# Patient Record
Sex: MALE | Race: WHITE | NOT HISPANIC OR LATINO | Employment: FULL TIME | URBAN - METROPOLITAN AREA
[De-identification: names, ages, dates, MRNs, and addresses within clinical notes are randomized per-mention and may not be internally consistent; named-entity substitution may affect disease eponyms.]

---

## 2024-02-11 ENCOUNTER — HOSPITAL ENCOUNTER (EMERGENCY)
Facility: HOSPITAL | Age: 34
Discharge: HOME | End: 2024-02-11
Attending: EMERGENCY MEDICINE

## 2024-02-11 VITALS
WEIGHT: 245 LBS | RESPIRATION RATE: 20 BRPM | TEMPERATURE: 98 F | DIASTOLIC BLOOD PRESSURE: 107 MMHG | BODY MASS INDEX: 34.3 KG/M2 | HEIGHT: 71 IN | SYSTOLIC BLOOD PRESSURE: 141 MMHG | HEART RATE: 92 BPM | OXYGEN SATURATION: 96 %

## 2024-02-11 DIAGNOSIS — I49.3 PVC'S (PREMATURE VENTRICULAR CONTRACTIONS): Primary | ICD-10-CM

## 2024-02-11 DIAGNOSIS — R00.2 PALPITATIONS: ICD-10-CM

## 2024-02-11 PROCEDURE — 93010 ELECTROCARDIOGRAM REPORT: CPT | Mod: ,,, | Performed by: INTERNAL MEDICINE

## 2024-02-11 PROCEDURE — 99283 EMERGENCY DEPT VISIT LOW MDM: CPT | Mod: 25

## 2024-02-11 PROCEDURE — 93005 ELECTROCARDIOGRAM TRACING: CPT

## 2024-02-11 NOTE — DISCHARGE INSTRUCTIONS
Follow-up with cardiology as necessary, decrease the amount of nicotine and caffeine stimulants that your drinking throughout the day.  This means cutting a red Bulls or your power drinks down to 1 a day.  Cut your cigarettes to zero.

## 2024-02-11 NOTE — ED PROVIDER NOTES
"Encounter Date: 2/11/2024       History     Chief Complaint   Patient presents with    Palpitations     Intermittent palpitations x 2 weeks     Well-developed 33-year-old male comes emergency room via his mother with concerns of "odd heartbeats".  The patient states every now and then he feels a thump into his chest.  Feels like his heart is "beating backwards".  The patient denies any chest pain associated with this.  Was concerned inside the comes emergency room.  The patient has drank too much to drink some morning.  Smokes half pack cigarettes a day.  Drinks copious amounts of coffee throughout the day as well.  Past medical history of anxiety bipolar herpes and hypertension.  He appears stable at this time.      Review of patient's allergies indicates:  No Known Allergies  Past Medical History:   Diagnosis Date    Anxiety     Bipolar 1 disorder     Herpes     Hypertension      History reviewed. No pertinent surgical history.  History reviewed. No pertinent family history.  Social History     Tobacco Use    Smoking status: Every Day     Types: Cigarettes    Smokeless tobacco: Never   Substance Use Topics    Drug use: Yes     Types: Marijuana     Review of Systems   Constitutional:  Negative for fever.   HENT:  Negative for sore throat.    Respiratory:  Negative for shortness of breath.    Cardiovascular:  Positive for palpitations (Occasional palpitation.). Negative for chest pain.   Gastrointestinal:  Negative for nausea.   Genitourinary:  Negative for dysuria.   Musculoskeletal:  Negative for back pain.   Skin:  Negative for rash.   Neurological:  Negative for weakness.   Hematological:  Does not bruise/bleed easily.   All other systems reviewed and are negative.      Physical Exam     Initial Vitals [02/11/24 1453]   BP Pulse Resp Temp SpO2   (!) 141/107 92 20 98.2 °F (36.8 °C) 96 %      MAP       --         Physical Exam    Nursing note and vitals reviewed.  Constitutional: He appears well-developed and " well-nourished.   HENT:   Head: Normocephalic and atraumatic.   Eyes: EOM are normal. Pupils are equal, round, and reactive to light. No scleral icterus.   Neck: Neck supple.   Normal range of motion.  Cardiovascular:  Normal rate, regular rhythm and normal heart sounds.           No murmur heard.  Positive occasional palpitation.  Primarily PVC.   Pulmonary/Chest: Breath sounds normal. He has no wheezes. He has no rales. He exhibits no tenderness.   Abdominal: Abdomen is soft. Bowel sounds are normal.   Musculoskeletal:         General: Normal range of motion.      Cervical back: Normal range of motion and neck supple.     Neurological: He is alert and oriented to person, place, and time. He has normal strength. GCS score is 15. GCS eye subscore is 4. GCS verbal subscore is 5. GCS motor subscore is 6.   Skin: Skin is warm and dry.   Psychiatric: He has a normal mood and affect. His behavior is normal. Judgment and thought content normal.         ED Course   Procedures  Labs Reviewed - No data to display  EKG Readings: (Independently Interpreted)   Rate 85, normal sinus rhythm, normal axis, normal QRS, normal T-wave, normal P wave, normal EKG.       Imaging Results    None          Medications - No data to display  Medical Decision Making  A. fib RVR, A. fib, PVCs, electrolyte of the mouth is, MI, anxiety reaction, tachycardia, SVT.      Amount and/or Complexity of Data Reviewed  Discussion of management or test interpretation with external provider(s): The patient is stable this time.  The patient is having PVCs.  Likely secondary to caffeine and nicotine intake.  I discussed with the patient.  Also associated with some anxiety.  I discussed with the patient to decrease the intake of caffeine and stimulants he understands and agrees with the plan.  I will discharge patient at this time.  I will consult Cardiology for a follow-up.                                      Clinical Impression:  Final diagnoses:  [R00.2]  Palpitations  [I49.3] PVC's (premature ventricular contractions) (Primary)          ED Disposition Condition    Discharge Stable          ED Prescriptions    None       Follow-up Information    None          Siva Cook MD  02/11/24 1412

## 2024-02-12 LAB
OHS QRS DURATION: 86 MS
OHS QTC CALCULATION: 430 MS

## 2024-02-29 ENCOUNTER — TELEPHONE (OUTPATIENT)
Dept: CARDIOLOGY | Facility: CLINIC | Age: 34
End: 2024-02-29

## 2024-03-01 ENCOUNTER — TELEPHONE (OUTPATIENT)
Dept: CARDIOLOGY | Facility: CLINIC | Age: 34
End: 2024-03-01

## 2024-03-01 NOTE — TELEPHONE ENCOUNTER
Called first number and it is not working. Second number goes to a female. She states he already has the information about the referral and will remind him to call.

## 2024-03-11 ENCOUNTER — TELEPHONE (OUTPATIENT)
Dept: CARDIOLOGY | Facility: CLINIC | Age: 34
End: 2024-03-11

## 2024-03-11 NOTE — TELEPHONE ENCOUNTER
Called about cardio referral. Pt does not have a job so does not want to be scheduled at this time.